# Patient Record
Sex: MALE | Race: WHITE | NOT HISPANIC OR LATINO | URBAN - METROPOLITAN AREA
[De-identification: names, ages, dates, MRNs, and addresses within clinical notes are randomized per-mention and may not be internally consistent; named-entity substitution may affect disease eponyms.]

---

## 2018-03-01 ENCOUNTER — OUTPATIENT (OUTPATIENT)
Dept: OUTPATIENT SERVICES | Facility: HOSPITAL | Age: 33
LOS: 1 days | End: 2018-03-01
Payer: MEDICAID

## 2018-03-01 PROCEDURE — G9001: CPT

## 2018-03-14 DIAGNOSIS — R69 ILLNESS, UNSPECIFIED: ICD-10-CM

## 2019-01-05 ENCOUNTER — INPATIENT (INPATIENT)
Facility: HOSPITAL | Age: 34
LOS: 0 days | Discharge: LEFT BEFORE DISCHARGE | DRG: 202 | End: 2019-01-05
Attending: INTERNAL MEDICINE | Admitting: INTERNAL MEDICINE
Payer: MEDICAID

## 2019-01-05 VITALS
OXYGEN SATURATION: 90 % | SYSTOLIC BLOOD PRESSURE: 140 MMHG | RESPIRATION RATE: 18 BRPM | DIASTOLIC BLOOD PRESSURE: 75 MMHG | HEART RATE: 110 BPM

## 2019-01-05 VITALS
RESPIRATION RATE: 30 BRPM | HEART RATE: 150 BPM | DIASTOLIC BLOOD PRESSURE: 77 MMHG | SYSTOLIC BLOOD PRESSURE: 183 MMHG | OXYGEN SATURATION: 90 %

## 2019-01-05 DIAGNOSIS — N17.9 ACUTE KIDNEY FAILURE, UNSPECIFIED: ICD-10-CM

## 2019-01-05 DIAGNOSIS — Z29.9 ENCOUNTER FOR PROPHYLACTIC MEASURES, UNSPECIFIED: ICD-10-CM

## 2019-01-05 DIAGNOSIS — R63.8 OTHER SYMPTOMS AND SIGNS CONCERNING FOOD AND FLUID INTAKE: ICD-10-CM

## 2019-01-05 DIAGNOSIS — Z91.89 OTHER SPECIFIED PERSONAL RISK FACTORS, NOT ELSEWHERE CLASSIFIED: ICD-10-CM

## 2019-01-05 DIAGNOSIS — J18.9 PNEUMONIA, UNSPECIFIED ORGANISM: ICD-10-CM

## 2019-01-05 DIAGNOSIS — J45.901 UNSPECIFIED ASTHMA WITH (ACUTE) EXACERBATION: ICD-10-CM

## 2019-01-05 LAB
ALBUMIN SERPL ELPH-MCNC: 4.5 G/DL — SIGNIFICANT CHANGE UP (ref 3.3–5)
ALP SERPL-CCNC: 111 U/L — SIGNIFICANT CHANGE UP (ref 40–120)
ALT FLD-CCNC: 27 U/L — SIGNIFICANT CHANGE UP (ref 10–45)
ANION GAP SERPL CALC-SCNC: 20 MMOL/L — HIGH (ref 5–17)
AST SERPL-CCNC: 27 U/L — SIGNIFICANT CHANGE UP (ref 10–40)
BASE EXCESS BLDV CALC-SCNC: -8.5 MMOL/L — SIGNIFICANT CHANGE UP
BASOPHILS NFR BLD AUTO: 1.5 % — SIGNIFICANT CHANGE UP (ref 0–2)
BILIRUB SERPL-MCNC: 0.5 MG/DL — SIGNIFICANT CHANGE UP (ref 0.2–1.2)
BUN SERPL-MCNC: 31 MG/DL — HIGH (ref 7–23)
CALCIUM SERPL-MCNC: 9 MG/DL — SIGNIFICANT CHANGE UP (ref 8.4–10.5)
CHLORIDE SERPL-SCNC: 98 MMOL/L — SIGNIFICANT CHANGE UP (ref 96–108)
CO2 SERPL-SCNC: 23 MMOL/L — SIGNIFICANT CHANGE UP (ref 22–31)
CREAT ?TM UR-MCNC: 34 MG/DL — SIGNIFICANT CHANGE UP
CREAT SERPL-MCNC: 1.5 MG/DL — HIGH (ref 0.5–1.3)
EOSINOPHIL NFR BLD AUTO: 9 % — HIGH (ref 0–6)
GLUCOSE SERPL-MCNC: 168 MG/DL — HIGH (ref 70–99)
HCO3 BLDV-SCNC: 20 MMOL/L — SIGNIFICANT CHANGE UP (ref 20–27)
HCT VFR BLD CALC: 43.8 % — SIGNIFICANT CHANGE UP (ref 39–50)
HGB BLD-MCNC: 14 G/DL — SIGNIFICANT CHANGE UP (ref 13–17)
LACTATE SERPL-SCNC: 2.1 MMOL/L — HIGH (ref 0.5–2)
LACTATE SERPL-SCNC: 5.7 MMOL/L — CRITICAL HIGH (ref 0.5–2)
LYMPHOCYTES # BLD AUTO: 29.9 % — SIGNIFICANT CHANGE UP (ref 13–44)
MAGNESIUM SERPL-MCNC: 3.7 MG/DL — HIGH (ref 1.6–2.6)
MCHC RBC-ENTMCNC: 29 PG — SIGNIFICANT CHANGE UP (ref 27–34)
MCHC RBC-ENTMCNC: 32 G/DL — SIGNIFICANT CHANGE UP (ref 32–36)
MCV RBC AUTO: 90.9 FL — SIGNIFICANT CHANGE UP (ref 80–100)
MONOCYTES NFR BLD AUTO: 11.2 % — SIGNIFICANT CHANGE UP (ref 2–14)
NEUTROPHILS NFR BLD AUTO: 48.4 % — SIGNIFICANT CHANGE UP (ref 43–77)
PCO2 BLDV: 53 MMHG — HIGH (ref 41–51)
PH BLDV: 7.2 — CRITICAL LOW (ref 7.32–7.43)
PLATELET # BLD AUTO: 267 K/UL — SIGNIFICANT CHANGE UP (ref 150–400)
PO2 BLDV: 124 MMHG — SIGNIFICANT CHANGE UP
POTASSIUM SERPL-MCNC: 4.6 MMOL/L — SIGNIFICANT CHANGE UP (ref 3.5–5.3)
POTASSIUM SERPL-SCNC: 4.6 MMOL/L — SIGNIFICANT CHANGE UP (ref 3.5–5.3)
PROT SERPL-MCNC: 8 G/DL — SIGNIFICANT CHANGE UP (ref 6–8.3)
RAPID RVP RESULT: SIGNIFICANT CHANGE UP
RBC # BLD: 4.82 M/UL — SIGNIFICANT CHANGE UP (ref 4.2–5.8)
RBC # FLD: 13.5 % — SIGNIFICANT CHANGE UP (ref 10.3–16.9)
SAO2 % BLDV: 98 % — SIGNIFICANT CHANGE UP
SODIUM SERPL-SCNC: 141 MMOL/L — SIGNIFICANT CHANGE UP (ref 135–145)
SODIUM UR-SCNC: 128 MMOL/L — SIGNIFICANT CHANGE UP
WBC # BLD: 10.9 K/UL — HIGH (ref 3.8–10.5)
WBC # FLD AUTO: 10.9 K/UL — HIGH (ref 3.8–10.5)

## 2019-01-05 PROCEDURE — 71045 X-RAY EXAM CHEST 1 VIEW: CPT | Mod: 26

## 2019-01-05 PROCEDURE — 93010 ELECTROCARDIOGRAM REPORT: CPT

## 2019-01-05 PROCEDURE — 99285 EMERGENCY DEPT VISIT HI MDM: CPT | Mod: 25

## 2019-01-05 RX ORDER — BUDESONIDE AND FORMOTEROL FUMARATE DIHYDRATE 160; 4.5 UG/1; UG/1
2 AEROSOL RESPIRATORY (INHALATION) EVERY 12 HOURS
Qty: 0 | Refills: 0 | Status: DISCONTINUED | OUTPATIENT
Start: 2019-01-05 | End: 2019-01-05

## 2019-01-05 RX ORDER — AZITHROMYCIN 500 MG/1
500 TABLET, FILM COATED ORAL EVERY 24 HOURS
Qty: 0 | Refills: 0 | Status: CANCELLED | OUTPATIENT
Start: 2019-01-06 | End: 2019-01-05

## 2019-01-05 RX ORDER — DEXTROSE 50 % IN WATER 50 %
15 SYRINGE (ML) INTRAVENOUS ONCE
Qty: 0 | Refills: 0 | Status: DISCONTINUED | OUTPATIENT
Start: 2019-01-05 | End: 2019-01-05

## 2019-01-05 RX ORDER — ALBUTEROL 90 UG/1
2.5 AEROSOL, METERED ORAL ONCE
Qty: 0 | Refills: 0 | Status: DISCONTINUED | OUTPATIENT
Start: 2019-01-05 | End: 2019-01-05

## 2019-01-05 RX ORDER — MAGNESIUM SULFATE 500 MG/ML
2 VIAL (ML) INJECTION ONCE
Qty: 0 | Refills: 0 | Status: COMPLETED | OUTPATIENT
Start: 2019-01-05 | End: 2019-01-05

## 2019-01-05 RX ORDER — DEXTROSE 50 % IN WATER 50 %
25 SYRINGE (ML) INTRAVENOUS ONCE
Qty: 0 | Refills: 0 | Status: DISCONTINUED | OUTPATIENT
Start: 2019-01-05 | End: 2019-01-05

## 2019-01-05 RX ORDER — FAMOTIDINE 10 MG/ML
20 INJECTION INTRAVENOUS ONCE
Qty: 0 | Refills: 0 | Status: COMPLETED | OUTPATIENT
Start: 2019-01-05 | End: 2019-01-05

## 2019-01-05 RX ORDER — EPINEPHRINE 0.3 MG/.3ML
0.3 INJECTION INTRAMUSCULAR; SUBCUTANEOUS ONCE
Qty: 0 | Refills: 0 | Status: COMPLETED | OUTPATIENT
Start: 2019-01-05 | End: 2019-01-05

## 2019-01-05 RX ORDER — SODIUM CHLORIDE 9 MG/ML
1000 INJECTION INTRAMUSCULAR; INTRAVENOUS; SUBCUTANEOUS ONCE
Qty: 0 | Refills: 0 | Status: COMPLETED | OUTPATIENT
Start: 2019-01-05 | End: 2019-01-05

## 2019-01-05 RX ORDER — IPRATROPIUM/ALBUTEROL SULFATE 18-103MCG
3 AEROSOL WITH ADAPTER (GRAM) INHALATION
Qty: 0 | Refills: 0 | Status: COMPLETED | OUTPATIENT
Start: 2019-01-05 | End: 2019-01-05

## 2019-01-05 RX ORDER — MONTELUKAST 4 MG/1
10 TABLET, CHEWABLE ORAL ONCE
Qty: 0 | Refills: 0 | Status: COMPLETED | OUTPATIENT
Start: 2019-01-05 | End: 2019-01-05

## 2019-01-05 RX ORDER — ALBUTEROL 90 UG/1
2.5 AEROSOL, METERED ORAL
Qty: 0 | Refills: 0 | Status: COMPLETED | OUTPATIENT
Start: 2019-01-05 | End: 2019-01-05

## 2019-01-05 RX ORDER — IPRATROPIUM/ALBUTEROL SULFATE 18-103MCG
3 AEROSOL WITH ADAPTER (GRAM) INHALATION EVERY 4 HOURS
Qty: 0 | Refills: 0 | Status: DISCONTINUED | OUTPATIENT
Start: 2019-01-05 | End: 2019-01-05

## 2019-01-05 RX ORDER — AZITHROMYCIN 500 MG/1
500 TABLET, FILM COATED ORAL ONCE
Qty: 0 | Refills: 0 | Status: COMPLETED | OUTPATIENT
Start: 2019-01-05 | End: 2019-01-05

## 2019-01-05 RX ORDER — ALBUTEROL 90 UG/1
2.5 AEROSOL, METERED ORAL ONCE
Qty: 0 | Refills: 0 | Status: COMPLETED | OUTPATIENT
Start: 2019-01-05 | End: 2019-01-05

## 2019-01-05 RX ORDER — OXYMETAZOLINE HYDROCHLORIDE 0.5 MG/ML
1 SPRAY NASAL
Qty: 0 | Refills: 0 | Status: DISCONTINUED | OUTPATIENT
Start: 2019-01-05 | End: 2019-01-05

## 2019-01-05 RX ORDER — ALBUTEROL 90 UG/1
1 AEROSOL, METERED ORAL ONCE
Qty: 0 | Refills: 0 | Status: COMPLETED | OUTPATIENT
Start: 2019-01-05 | End: 2019-01-05

## 2019-01-05 RX ORDER — INSULIN LISPRO 100/ML
VIAL (ML) SUBCUTANEOUS
Qty: 0 | Refills: 0 | Status: DISCONTINUED | OUTPATIENT
Start: 2019-01-05 | End: 2019-01-05

## 2019-01-05 RX ORDER — FLUTICASONE PROPIONATE 50 MCG
1 SPRAY, SUSPENSION NASAL
Qty: 0 | Refills: 0 | Status: DISCONTINUED | OUTPATIENT
Start: 2019-01-05 | End: 2019-01-05

## 2019-01-05 RX ORDER — IPRATROPIUM/ALBUTEROL SULFATE 18-103MCG
3 AEROSOL WITH ADAPTER (GRAM) INHALATION ONCE
Qty: 0 | Refills: 0 | Status: COMPLETED | OUTPATIENT
Start: 2019-01-05 | End: 2019-01-05

## 2019-01-05 RX ORDER — SODIUM CHLORIDE 9 MG/ML
1000 INJECTION, SOLUTION INTRAVENOUS
Qty: 0 | Refills: 0 | Status: DISCONTINUED | OUTPATIENT
Start: 2019-01-05 | End: 2019-01-05

## 2019-01-05 RX ORDER — TIOTROPIUM BROMIDE 18 UG/1
1 CAPSULE ORAL; RESPIRATORY (INHALATION) DAILY
Qty: 0 | Refills: 0 | Status: DISCONTINUED | OUTPATIENT
Start: 2019-01-05 | End: 2019-01-05

## 2019-01-05 RX ORDER — GLUCAGON INJECTION, SOLUTION 0.5 MG/.1ML
1 INJECTION, SOLUTION SUBCUTANEOUS ONCE
Qty: 0 | Refills: 0 | Status: DISCONTINUED | OUTPATIENT
Start: 2019-01-05 | End: 2019-01-05

## 2019-01-05 RX ORDER — MAGNESIUM SULFATE 500 MG/ML
2 VIAL (ML) INJECTION ONCE
Qty: 0 | Refills: 0 | Status: DISCONTINUED | OUTPATIENT
Start: 2019-01-05 | End: 2019-01-05

## 2019-01-05 RX ORDER — DEXTROSE 50 % IN WATER 50 %
12.5 SYRINGE (ML) INTRAVENOUS ONCE
Qty: 0 | Refills: 0 | Status: DISCONTINUED | OUTPATIENT
Start: 2019-01-05 | End: 2019-01-05

## 2019-01-05 RX ADMIN — ALBUTEROL 2.5 MILLIGRAM(S): 90 AEROSOL, METERED ORAL at 02:35

## 2019-01-05 RX ADMIN — FAMOTIDINE 20 MILLIGRAM(S): 10 INJECTION INTRAVENOUS at 02:05

## 2019-01-05 RX ADMIN — SODIUM CHLORIDE 1000 MILLILITER(S): 9 INJECTION INTRAMUSCULAR; INTRAVENOUS; SUBCUTANEOUS at 02:30

## 2019-01-05 RX ADMIN — Medication 3 MILLILITER(S): at 02:27

## 2019-01-05 RX ADMIN — Medication 3 MILLILITER(S): at 02:28

## 2019-01-05 RX ADMIN — Medication 3 MILLILITER(S): at 05:07

## 2019-01-05 RX ADMIN — Medication 1 SPRAY(S): at 07:11

## 2019-01-05 RX ADMIN — EPINEPHRINE 0.3 MILLIGRAM(S): 0.3 INJECTION INTRAMUSCULAR; SUBCUTANEOUS at 02:00

## 2019-01-05 RX ADMIN — Medication 3 MILLILITER(S): at 02:00

## 2019-01-05 RX ADMIN — Medication 50 GRAM(S): at 02:10

## 2019-01-05 RX ADMIN — SODIUM CHLORIDE 2000 MILLILITER(S): 9 INJECTION INTRAMUSCULAR; INTRAVENOUS; SUBCUTANEOUS at 02:00

## 2019-01-05 RX ADMIN — Medication 125 MILLIGRAM(S): at 02:08

## 2019-01-05 RX ADMIN — Medication 80 MILLIGRAM(S): at 05:07

## 2019-01-05 RX ADMIN — OXYMETAZOLINE HYDROCHLORIDE 1 SPRAY(S): 0.5 SPRAY NASAL at 07:11

## 2019-01-05 RX ADMIN — Medication 2 GRAM(S): at 02:28

## 2019-01-05 RX ADMIN — ALBUTEROL 1 PUFF(S): 90 AEROSOL, METERED ORAL at 07:28

## 2019-01-05 RX ADMIN — AZITHROMYCIN 255 MILLIGRAM(S): 500 TABLET, FILM COATED ORAL at 04:27

## 2019-01-05 RX ADMIN — BUDESONIDE AND FORMOTEROL FUMARATE DIHYDRATE 2 PUFF(S): 160; 4.5 AEROSOL RESPIRATORY (INHALATION) at 07:28

## 2019-01-05 RX ADMIN — ALBUTEROL 2.5 MILLIGRAM(S): 90 AEROSOL, METERED ORAL at 03:30

## 2019-01-05 RX ADMIN — ALBUTEROL 2.5 MILLIGRAM(S): 90 AEROSOL, METERED ORAL at 03:25

## 2019-01-05 RX ADMIN — SODIUM CHLORIDE 2000 MILLILITER(S): 9 INJECTION INTRAMUSCULAR; INTRAVENOUS; SUBCUTANEOUS at 02:15

## 2019-01-05 RX ADMIN — ALBUTEROL 2.5 MILLIGRAM(S): 90 AEROSOL, METERED ORAL at 03:35

## 2019-01-05 RX ADMIN — ALBUTEROL 2.5 MILLIGRAM(S): 90 AEROSOL, METERED ORAL at 02:50

## 2019-01-05 RX ADMIN — MONTELUKAST 10 MILLIGRAM(S): 4 TABLET, CHEWABLE ORAL at 05:07

## 2019-01-05 RX ADMIN — Medication 3 MILLILITER(S): at 07:11

## 2019-01-05 NOTE — ED ADULT NURSE REASSESSMENT NOTE - NS ED NURSE REASSESS COMMENT FT1
Pt continues to wheeze but reports improvement in breathing. Pt pending ICU consult. Close monitoring continues

## 2019-01-05 NOTE — ED ADULT NURSE NOTE - OBJECTIVE STATEMENT
Pt with c/o of Asthma attack. Pt in distress. Noted to be cyanotic, posturing. Pt upgraded and emergent treatment administered. Pt responding positively

## 2019-01-05 NOTE — CONSULT NOTE ADULT - SUBJECTIVE AND OBJECTIVE BOX
**************************  CRITICAL CARE CONSULT NOTE    34 y/o M with a history of asthma (never intubated, never hospitalized), walked into ED for progressively worsening SOB.     In the ED, VS T     PMHx: as above.  Surg hx: as above.  Meds: albuterol, symbicort  All: seasonal allergies  Soc hx: never smoker, denies etoh or drugs  Fhx: non-contributory    VITAL SIGNS:  ICU Vital Signs Last 24 Hrs  T(C): 36.7 (05 Jan 2019 02:35), Max: 36.7 (05 Jan 2019 02:35)  T(F): 98 (05 Jan 2019 02:35), Max: 98 (05 Jan 2019 02:35)  HR: 116 (05 Jan 2019 03:51) (115 - 150)  BP: 118/61 (05 Jan 2019 03:51) (118/61 - 183/77)  RR: 22 (05 Jan 2019 03:51) (22 - 30)  SpO2: 97% (05 Jan 2019 03:51) (90% - 98%)    PHYSICAL EXAM   General: NAD, comfortable, AOx3  HEENT: NCAT, PERRL, clear conjunctiva, no scleral icterus  Neck: supple, no JVD  Respiratory: CTA b/l, no wheezing, rhonchi, rales  Cardiovascular: RRR, normal S1S2, no M/R/G  Abdomen: soft, NT/ND, bowel sounds normoactive throughout, no palpable masses  Extremities: WWP, no clubbing, cyanosis, or edema  Neuro: AOx3, no motor or sensory deficits    LABS                        14.0   10.9  )-----------( 267      ( 05 Jan 2019 02:26 )             43.8     01-05    141  |  98  |  31<H>  ----------------------------<  168<H>  4.6   |  23  |  1.50<H>    Ca    9.0      05 Jan 2019 02:26  Mg     3.7     01-05    TPro  8.0  /  Alb  4.5  /  TBili  0.5  /  DBili  x   /  AST  27  /  ALT  27  /  AlkPhos  111  01-05      Lactate, Blood: 5.7 mmoL/L (01-05-19 @ 02:25)        IMAGING  CXR -   EKG - **************************  CRITICAL CARE CONSULT NOTE    32 y/o M with a history of asthma (never intubated, never hospitalized), walked into ED for progressively worsening SOB. States that he ran out of albuterol and symbicort two to three weeks ago. For the last several days, has been having worsening cough, rhinorrhea, and sore throat. Today had worsening SOB and took a cab to the ER. Otherwise denies fever, chills, lightheadedness, palpitations, N/V/D/C, abdominal pain, dysuria, hematuria, changes in bowel habits, LE edema.     In the ED, VS T 98, , /77, RR 30, O2 90 on NRB. Labs significant for WBC 10.9 (neuts 48%, eos 9%), BUN/Cr 31/1.50, lactate 5.7, ABG pH 7.20, pCO2 53, pO2 124, HCO3 20, O2 sat 98. CXR with reticulonodular opacities and elevated left hemidiaphragm.     PMHx: as above.  Surg hx: as above.  Meds: albuterol, symbicort  All: seasonal allergies  Soc hx: never smoker, denies etoh or drugs  Fhx: non-contributory    VITAL SIGNS:  ICU Vital Signs Last 24 Hrs  T(C): 36.7 (05 Jan 2019 02:35), Max: 36.7 (05 Jan 2019 02:35)  T(F): 98 (05 Jan 2019 02:35), Max: 98 (05 Jan 2019 02:35)  HR: 116 (05 Jan 2019 03:51) (115 - 150)  BP: 118/61 (05 Jan 2019 03:51) (118/61 - 183/77)  RR: 22 (05 Jan 2019 03:51) (22 - 30)  SpO2: 97% (05 Jan 2019 03:51) (90% - 98%)    PHYSICAL EXAM   General: NAD, speaking in full sentences through ventimask, nasal voice, obese  HEENT: NCAT, PERRL, clear conjunctiva, no scleral icterus  Neck: supple, no JVD, wide neck circumference  Respiratory: end expiratory wheezing b/l, decreased breath sounds at the bases, no accessory muscle use at this time  Cardiovascular: tachycardic 120s, regular rhythm, normal S1S2, no M/R/G  Abdomen: soft, NT/ND, bowel sounds normoactive throughout, no palpable masses; obese  Extremities: WWP, no clubbing, cyanosis, or edema  Neuro: AOx3, no motor or sensory deficits    LABS                        14.0   10.9  )-----------( 267      ( 05 Jan 2019 02:26 )             43.8     01-05    141  |  98  |  31<H>  ----------------------------<  168<H>  4.6   |  23  |  1.50<H>    Ca    9.0      05 Jan 2019 02:26  Mg     3.7     01-05    TPro  8.0  /  Alb  4.5  /  TBili  0.5  /  DBili  x   /  AST  27  /  ALT  27  /  AlkPhos  111  01-05    Lactate, Blood: 5.7 mmoL/L (01-05-19 @ 02:25)    IMAGING  CXR -   EKG - **************************  CRITICAL CARE CONSULT NOTE    32 y/o M with a history of asthma (never intubated, never hospitalized), walked into ED for progressively worsening SOB. States that he ran out of albuterol and symbicort two to three weeks ago. For the last several days, has been having worsening cough, rhinorrhea, and sore throat. Today had worsening SOB and took a cab to the ER. Denies nighttime awakenings, dyspnea on exertion at baseline, prior intubations/hospitalizations, sick contacts, recent travel. He is adamant that he will "sign out" come morning. Otherwise denies fever, chills, lightheadedness, palpitations, N/V/D/C, abdominal pain, dysuria, hematuria, changes in bowel habits, LE edema.    In the ED, VS T 98, , /77, RR 30, O2 90 on NRB. Labs significant for WBC 10.9 (neuts 48%, eos 9%), BUN/Cr 31/1.50, lactate 5.7, ABG pH 7.20, pCO2 53, pO2 124, HCO3 20, O2 sat 98. CXR with reticulonodular opacities and elevated left hemidiaphragm.     PMHx: as above.  Surg hx: as above.  Meds: albuterol, symbicort  All: seasonal allergies  Soc hx: never smoker, denies etoh or drugs  Fhx: non-contributory    VITAL SIGNS:  ICU Vital Signs Last 24 Hrs  T(C): 36.7 (05 Jan 2019 02:35), Max: 36.7 (05 Jan 2019 02:35)  T(F): 98 (05 Jan 2019 02:35), Max: 98 (05 Jan 2019 02:35)  HR: 116 (05 Jan 2019 03:51) (115 - 150)  BP: 118/61 (05 Jan 2019 03:51) (118/61 - 183/77)  RR: 22 (05 Jan 2019 03:51) (22 - 30)  SpO2: 97% (05 Jan 2019 03:51) (90% - 98%)    PHYSICAL EXAM   General: NAD, speaking in full sentences through ventimask, nasal voice, obese  HEENT: NCAT, PERRL, clear conjunctiva, no scleral icterus  Neck: supple, no JVD, wide neck circumference  Respiratory: end expiratory wheezing b/l, decreased breath sounds at the bases, no accessory muscle use at this time  Cardiovascular: tachycardic 120s, regular rhythm, normal S1S2, no M/R/G  Abdomen: soft, NT/ND, bowel sounds normoactive throughout, no palpable masses; obese  Extremities: WWP, no clubbing, cyanosis, or edema  Neuro: AOx3, no motor or sensory deficits    LABS                        14.0   10.9  )-----------( 267      ( 05 Jan 2019 02:26 )             43.8     01-05    141  |  98  |  31<H>  ----------------------------<  168<H>  4.6   |  23  |  1.50<H>    Ca    9.0      05 Jan 2019 02:26  Mg     3.7     01-05    TPro  8.0  /  Alb  4.5  /  TBili  0.5  /  DBili  x   /  AST  27  /  ALT  27  /  AlkPhos  111  01-05    Lactate, Blood: 5.7 mmoL/L (01-05-19 @ 02:25)    IMAGING  CXR -   EKG -

## 2019-01-05 NOTE — H&P ADULT - PROBLEM SELECTOR PLAN 6
1) PCP Contacted on Admission: (Y/N) --> Name & Phone #: N. admitted overnight. Son Corley MD [phone]  2) Date of Contact with PCP:  3) PCP Contacted at Discharge: (Y/N)  4) Summary of Handoff Given to PCP:   5) Post-Discharge Appointment Date and Location:

## 2019-01-05 NOTE — H&P ADULT - HISTORY OF PRESENT ILLNESS
34 yo w hx of asthma presented to ED w SOB found to be cyanotic and hypoxic. Patient states he ran out of his normal medications (ventolin inhaler and symbicort) for past few weeks. 2-3 days ago started having URI symptoms and yesterday had worsening SOB. Presented to ED in a taxi and was found to be cyanotic w unreadable O2 sat.    In the ED, initial vital signs T 98,  (now 116), /77 (now 118/61), RR 30 (now 22), O2 sat 90 (now 97). Labs significant for eosinophilia 9.0, Lactate 5.7->2.1, AG 20, BUN 31, Cr 1.5. ABG w pH 7.2, pCO2 52, O2sat 98. 32 yo w hx of asthma presented to ED via cab w SOB found to be cyanotic and hypoxic. Patient states he ran out of his normal medications (ventolin inhaler and symbicort) for past few weeks. 2-3 days ago started having URI symptoms including rhinorrhea, cough, and yesterday had worsening SOB. Presented to ED in a taxi and was found to be cyanotic w unreadable O2 sat. Patient denies fever, chills, HA, N/V, CP, abdominal pain, changes in bowel or bladder habits    In the ED, initial vital signs T 98,  (now 116), /77 (now 118/61), RR 30 (now 22), O2 sat 90 (now 97). Labs significant for eosinophilia 9.0, Lactate 5.7->2.1, AG 20, BUN 31, Cr 1.5. ABG w pH 7.2, pCO2 52, O2sat 98. CXR with diffuse reticulonodular opacities, elevated L hemidiaphragm. In the ED, patient given  2L NS, epinephrine IM, famotidine 20mg IV, solumedrol 125 IV, albuterol nebs x3, duonebs x1, Mg 2mg IV, and was placed on ventimask. Now on 2L NC. Patient stating that he needs to leave at 8 am in order to be in [place] by tomorrow morning.

## 2019-01-05 NOTE — H&P ADULT - NSHPLABSRESULTS_GEN_ALL_CORE
.  LABS:                         14.0   10.9  )-----------( 267      ( 05 Jan 2019 02:26 )             43.8     01-05    141  |  98  |  31<H>  ----------------------------<  168<H>  4.6   |  23  |  1.50<H>    Ca    9.0      05 Jan 2019 02:26  Mg     3.7     01-05    TPro  8.0  /  Alb  4.5  /  TBili  0.5  /  DBili  x   /  AST  27  /  ALT  27  /  AlkPhos  111  01-05              Lactate, Blood: 5.7 mmoL/L (01-05 @ 02:25)      RADIOLOGY, EKG & ADDITIONAL TESTS:    CXR with diffuse reticulonodular opacities and elevated L hemidiaphragm.

## 2019-01-05 NOTE — ED ADULT NURSE NOTE - NSIMPLEMENTINTERV_GEN_ALL_ED
Implemented All Universal Safety Interventions:  [place] to call system. Call bell, personal items and telephone within reach. Instruct patient to call for assistance. Room bathroom lighting operational. Non-slip footwear when patient is off stretcher. Physically safe environment: no spills, clutter or unnecessary equipment. Stretcher in lowest position, wheels locked, appropriate side rails in place.

## 2019-01-05 NOTE — ED PROVIDER NOTE - PHYSICAL EXAMINATION
VITAL SIGNS: I have reviewed nursing notes and confirm.  CONSTITUTIONAL: Well-developed; well-nourished male diaphoretic, cyanotic, tripoding in severe respiratory distress appearing to be rai-respiratory arrest.  SKIN: Agree with RN documentation regarding decubitus evaluation. Remainder of skin exam is cool/clammy/cyanotic and diaphoretic, no acute rash.  HEAD: Normocephalic; atraumatic.  EYES: PERRL, EOM intact; conjunctiva and sclera clear.  ENT: No nasal discharge; airway clear.  NECK: Supple; non tender.  CARD: S1, S2 normal; no murmurs, gallops, or rubs. + tachycardic regular rhythm  RESP: + faint wheeze b/l with very little air movement, poor excursion, + tachypnea with supraclavicular retractions/nasal flaring  ABD: Normal bowel sounds; soft; non-distended; non-tender  EXT: Normal ROM. No peripheral edema, non-ttp, distal pulses intact all ext  NEURO: Alert, oriented. Grossly intact.  PSYCH: Cooperative, appropriate.

## 2019-01-05 NOTE — H&P ADULT - NSHPPHYSICALEXAM_GEN_ALL_CORE
.  VITAL SIGNS:  T(C): 36.7 (01-05-19 @ 02:35), Max: 36.7 (01-05-19 @ 02:35)  T(F): 98 (01-05-19 @ 02:35), Max: 98 (01-05-19 @ 02:35)  HR: 116 (01-05-19 @ 04:48) (115 - 150)  BP: 137/78 (01-05-19 @ 04:48) (118/61 - 183/77)  BP(mean): 101 (01-05-19 @ 04:45) (101 - 101)  RR: 19 (01-05-19 @ 04:48) (19 - 30)  SpO2: 91% (01-05-19 @ 04:48) (89% - 98%)  Wt(kg): --    PHYSICAL EXAM:    Constitutional: obese male resting comfortably in bed  Head: NC/AT  Eyes: PERRL, EOMI, anicteric sclera  ENT: +nasal discharge; uvula midline, no oropharyngeal erythema or exudates; dry mucus membranes  Neck: supple; no JVD or thyromegaly, large neck circumference  Respiratory: diffuse wheezing in all lung fields, tachypneic  Cardiac: +S1/S2; RRR; no M/R/G  Gastrointestinal: abdomen soft, NT/ND; no rebound or guarding; +BS  Extremities: WWP, no clubbing or cyanosis; no peripheral edema  Musculoskeletal: NROM x4; no joint swelling, tenderness or erythema  Vascular: 2+ radial, DP pulses B/L  Dermatologic: skin warm, dry and intact; no rashes, wounds, or scars  Lymphatic: no submandibular or cervical LAD  Neurologic: AAOx3; CNII-XII grossly intact; no focal deficits  Psychiatric: affect and characteristics of appearance, verbalizations, behaviors are appropriate

## 2019-01-05 NOTE — ED PROVIDER NOTE - OBJECTIVE STATEMENT
32 y/o M w/sx asthma, never intubated, normally on symbicort and ventolin but ran out of both, has been coughing c/o URI sx this week w/worsening work of breathing/SOB for past 2 days, acutely worse this evening. Denies fever/chills. Denies CP, abd pain or n/v. No known allergies. Presented to walk-in triage diaphoretic, cyanotic, moving very little air w/pulse ox not registering, in severe respiratory distress.

## 2019-01-05 NOTE — ED ADULT NURSE REASSESSMENT NOTE - NS ED NURSE REASSESS COMMENT FT1
Pt condition improved. Pt reports his breathing is improving. Pt is no longer cyanotic and diaphoretic. Pt responding positively to medications. Pt on cardiac monitoring with automatic BP and pulse ox in place. See flowsheet. Close monitoring continues.

## 2019-01-05 NOTE — CONSULT NOTE ADULT - ASSESSMENT
34 y/o M with a history of asthma (never intubated, never hospitalized), walked into ED for progressively worsening SOB. MICU consulted for management of asthma exacerbation.    1. Asthma exacerbation. History of asthma, never intubated or hospitalized. Denies having triggers. Ran out of albuterol and symbicort two to three weeks ago. Presented with URI symptoms and severe SOB. Initially cyanotic, with lactate 5.4 and VBG pH 7.20. CXR with reticulonodular opacities and elevated left hemidiaphragm. Exacerbation likely triggered by viral URI vs. pneumonia.   - Continue albuterol q4h standing.  - Solumedrol  - Azithromycin 500mg x 5 days.   - Trend lactate.  - Follow up blood cultures.  - BIPAP overnight if increased work of breathing.   - Flonase for nasal congestion.  - Patient planning on leaving AMA. If he does, can order albuterol and symbicort inhalers so he can take them home. Can give a dose of steroids before he leaves.    Case discussed with Dr. Fernandez (CCM attending) and ED attending.   Dispo: [address] [facility] for close monitoring of respiratory status.    Thank you for consulting [facility].     Dieudonne Briones, PGY-3

## 2019-01-05 NOTE — H&P ADULT - ASSESSMENT
34 yo w hx of asthma, non compliant w medications presenting w SOB due to asthma exacerbation 2/2 to CAP vs URI.

## 2019-01-05 NOTE — H&P ADULT - PROBLEM SELECTOR PLAN 1
Patient presented cyanotic w unreadable O2 sat. ran out of inhalers and has been having URI symptoms. CXR concerning for possible CAP. RVP negative. Treating as asthma exacerbation 2/2 to CAP  - s/p epi IM, famotidine 20mg IV, solumedrol 125 IV, albuterol nebs x3, duonebs x1, Mg 2mg IV in ED. also on ventimask in ED, now on 2L NC  - continue duonebs q4h standing and additional PRN  - will provide patient w ventolin rescue inhaler, singulair inhaler, and symbicort inhaler  - continue prednisone 80mg QD  - z-pac for cap  - f/u blood cx Patient presented cyanotic w unreadable O2 sat. ran out of inhalers and has been having URI symptoms. CXR concerning for possible CAP. RVP negative. Treating as asthma exacerbation 2/2 to CAP  - s/p epi IM, famotidine 20mg IV, solumedrol 125 IV, albuterol nebs x3, duonebs x1, Mg 2mg IV in ED. also on ventimask in ED, now on 2L NC  - continue duonebs q4h standing and additional PRN  - will provide patient w ventolin rescue inhaler, singulair inhaler, and symbicort inhaler  - continue prednisone 80mg QD  - z-pac for cap  - f/u blood cx  - afrin for nasal congestion

## 2019-01-05 NOTE — ED PROVIDER NOTE - MEDICAL DECISION MAKING DETAILS
+ severe respiratory distress/failure in setting of known asthma. Sx improved s/p epi, steroids, nebs, mag, IVF, though still with full insp/exp wheeze. Seen by ICU, accepted to telemetry.

## 2019-01-07 DIAGNOSIS — J45.901 UNSPECIFIED ASTHMA WITH (ACUTE) EXACERBATION: ICD-10-CM

## 2019-01-07 DIAGNOSIS — Z91.14 PATIENT'S OTHER NONCOMPLIANCE WITH MEDICATION REGIMEN: ICD-10-CM

## 2019-01-07 DIAGNOSIS — R06.02 SHORTNESS OF BREATH: ICD-10-CM

## 2019-01-07 DIAGNOSIS — J18.9 PNEUMONIA, UNSPECIFIED ORGANISM: ICD-10-CM

## 2019-01-07 DIAGNOSIS — N17.9 ACUTE KIDNEY FAILURE, UNSPECIFIED: ICD-10-CM

## 2019-01-07 DIAGNOSIS — J96.90 RESPIRATORY FAILURE, UNSPECIFIED, UNSPECIFIED WHETHER WITH HYPOXIA OR HYPERCAPNIA: ICD-10-CM

## 2019-01-07 DIAGNOSIS — E66.9 OBESITY, UNSPECIFIED: ICD-10-CM

## 2019-01-10 LAB
CULTURE RESULTS: SIGNIFICANT CHANGE UP
CULTURE RESULTS: SIGNIFICANT CHANGE UP
SPECIMEN SOURCE: SIGNIFICANT CHANGE UP
SPECIMEN SOURCE: SIGNIFICANT CHANGE UP

## 2019-01-14 ENCOUNTER — EMERGENCY (EMERGENCY)
Facility: HOSPITAL | Age: 34
LOS: 1 days | Discharge: HOME | End: 2019-01-14
Attending: EMERGENCY MEDICINE | Admitting: EMERGENCY MEDICINE
Payer: COMMERCIAL

## 2019-01-14 VITALS
TEMPERATURE: 98 F | DIASTOLIC BLOOD PRESSURE: 74 MMHG | RESPIRATION RATE: 22 BRPM | OXYGEN SATURATION: 93 % | SYSTOLIC BLOOD PRESSURE: 131 MMHG | HEART RATE: 109 BPM

## 2019-01-14 DIAGNOSIS — J45.901 UNSPECIFIED ASTHMA WITH (ACUTE) EXACERBATION: ICD-10-CM

## 2019-01-14 DIAGNOSIS — Z79.899 OTHER LONG TERM (CURRENT) DRUG THERAPY: ICD-10-CM

## 2019-01-14 DIAGNOSIS — Z79.52 LONG TERM (CURRENT) USE OF SYSTEMIC STEROIDS: ICD-10-CM

## 2019-01-14 PROBLEM — J45.909 UNSPECIFIED ASTHMA, UNCOMPLICATED: Chronic | Status: ACTIVE | Noted: 2019-01-05

## 2019-01-14 PROCEDURE — 99284 EMERGENCY DEPT VISIT MOD MDM: CPT

## 2019-01-14 PROCEDURE — 94640 AIRWAY INHALATION TREATMENT: CPT

## 2019-01-14 PROCEDURE — 99284 EMERGENCY DEPT VISIT MOD MDM: CPT | Mod: 25

## 2019-01-14 PROCEDURE — 96372 THER/PROPH/DIAG INJ SC/IM: CPT

## 2019-01-14 RX ORDER — IPRATROPIUM/ALBUTEROL SULFATE 18-103MCG
3 AEROSOL WITH ADAPTER (GRAM) INHALATION ONCE
Qty: 0 | Refills: 0 | Status: COMPLETED | OUTPATIENT
Start: 2019-01-14 | End: 2019-01-14

## 2019-01-14 RX ORDER — BUDESONIDE AND FORMOTEROL FUMARATE DIHYDRATE 160; 4.5 UG/1; UG/1
0 AEROSOL RESPIRATORY (INHALATION)
Qty: 0 | Refills: 0 | COMMUNITY

## 2019-01-14 RX ORDER — DEXAMETHASONE 0.5 MG/5ML
10 ELIXIR ORAL ONCE
Qty: 0 | Refills: 0 | Status: COMPLETED | OUTPATIENT
Start: 2019-01-14 | End: 2019-01-14

## 2019-01-14 RX ORDER — ALBUTEROL 90 UG/1
0 AEROSOL, METERED ORAL
Qty: 0 | Refills: 0 | COMMUNITY

## 2019-01-14 RX ORDER — ALBUTEROL 90 UG/1
2 AEROSOL, METERED ORAL
Qty: 9 | Refills: 0 | OUTPATIENT
Start: 2019-01-14 | End: 2019-02-12

## 2019-01-14 RX ORDER — BUDESONIDE AND FORMOTEROL FUMARATE DIHYDRATE 160; 4.5 UG/1; UG/1
2 AEROSOL RESPIRATORY (INHALATION) ONCE
Qty: 0 | Refills: 0 | Status: COMPLETED | OUTPATIENT
Start: 2019-01-14 | End: 2019-01-14

## 2019-01-14 RX ADMIN — Medication 3 MILLILITER(S): at 14:17

## 2019-01-14 RX ADMIN — Medication 10 MILLIGRAM(S): at 14:16

## 2019-01-14 RX ADMIN — Medication 3 MILLILITER(S): at 13:59

## 2019-01-14 RX ADMIN — BUDESONIDE AND FORMOTEROL FUMARATE DIHYDRATE 2 PUFF(S): 160; 4.5 AEROSOL RESPIRATORY (INHALATION) at 14:28

## 2019-01-14 NOTE — ED PROVIDER NOTE - NSFOLLOWUPCLINICS_GEN_ALL_ED_FT
[facility]  Family Medicine  [facility]. [address]  [place], [place] [facility]  Phone: [phone]  Fax:   Follow Up Time:

## 2019-01-14 NOTE — ED PROVIDER NOTE - OBJECTIVE STATEMENT
ventolin, symnicpty and no prefdbnone 34 yo w hx of asthma presented to ED with SOB, wheezing with asthma exacerbation and states that he ran out of his asthma meds (namely Symbicort). Pt was recently admitted to [facility] hospital 10 days ago for severe asthma exacerbation. Was not intubbate found to be cyanotic and hypoxic. Patient states he ran out of his normal medications (ventolin inhaler and symbicort) for past few weeks. 2-3 days ago started having URI symptoms including rhinorrhea, cough, and yesterday had worsening SOB. Presented to ED in a taxi and was found to be cyanotic w unreadable O2 sat. Patient denies fever, chills, HA, N/V, CP, abdominal pain, changes in bowel or bladder habits    In the ED, initial vital signs T 98,  (now 116), /77 (now 118/61), RR 30 (now 22), O2 sat 90 (now 97). Labs significant for eosinophilia 9.0, Lactate 5.7->2.1, AG 20, BUN 31, Cr 1.5. ABG w pH 7.2, pCO2 52, O2sat 98. CXR with diffuse reticulonodular opacities, elevated L hemidiaphragm. In the ED, patient given  2L NS, epinephrine IM, famotidine 20mg IV, solumedrol 125 IV, albuterol nebs x3, duonebs x1, Mg 2mg IV, and was placed on ventimask. Now on 2L NC. Patient stating that he needs to leave at 8 am in order to be in [place] by tomorrow 32 yo M with PMH of asthma, no prior intubations or asthma, presents today to ED with SOB, wheezing and asthma exacerbation and states that he ran out of his asthma meds (namely Symbicort). Pt was recently admitted to [facility] hospital 10 days ago for severe asthma exacerbation. Was not intubated but was found to be cyanotic and hypoxic and admitted to [address] [place] [address]. Patient states he ran out of his normal medications (ventolin inhaler and symbicort). Pt c/o some rhinorrhea, cough, and yesterday had worsening SOB. Patient denies fever, chills, HA, N/V, CP, abdominal pain, changes in bowel or bladder habits. Pt refusing IV line/ blood work/ w/up. States he has to leave in an hour for family reasons.

## 2019-01-14 NOTE — ED PROVIDER NOTE - MEDICAL DECISION MAKING DETAILS
34 yo M with PMH of asthma, no prior intubations or asthma, presents today to ED with SOB, wheezing and asthma exacerbation and states that he ran out of his asthma meds (namely Symbicort). Pt was recently admitted to [facility] hospital 10 days ago for severe asthma exacerbation. Was not intubated but was found to be cyanotic and hypoxic and admitted to [address] [place] [address]. Patient states he ran out of his normal medications (ventolin inhaler and symbicort). Pt c/o some rhinorrhea, cough, and yesterday had worsening SOB. Patient denies fever, chills, HA, N/V, CP, abdominal pain, changes in bowel or bladder habits. Pt refusing IV line/ blood work/ w/up. States he has to leave in an hour for family reasons. Given IM Decadron and nebs. Pt with O2 sats in low 90s. Resp effort normal. Does not want to stay or be observed and is walking out of ED after nebs and inhaler given. Return precautions given.

## 2019-01-14 NOTE — ED PROVIDER NOTE - CARE PROVIDER_API CALL
Ciaran Dobson), Critical Care Medicine; Pulmonary Disease  [address]  [place] [address]  Phone: [phone]  Fax: [phone]    Janette Choudhary), Critical Care Medicine; Pulmonary Disease  [address]  [address] [place] [address]  Phone: [phone]  Fax: [phone]

## 2019-01-14 NOTE — ED ADULT NURSE NOTE - OBJECTIVE STATEMENT
Patient is a 34yo male reporting he ran out of Albuterol inhaler and was supposed to be taking prednisone for asthma exacerbation but "it was never prescribed." Patient reports he feels like asthma is getting worse. Denies any chest pain, abdominal pain, n/v/d. Patient speaking in full sentences, lung sounds clear bilaterally. Patient also states, "I feel like I could just be anxious."

## 2019-10-31 PROCEDURE — 87486 CHLMYD PNEUM DNA AMP PROBE: CPT

## 2019-10-31 PROCEDURE — 87581 M.PNEUMON DNA AMP PROBE: CPT

## 2019-10-31 PROCEDURE — 96365 THER/PROPH/DIAG IV INF INIT: CPT

## 2019-10-31 PROCEDURE — 99285 EMERGENCY DEPT VISIT HI MDM: CPT | Mod: 25

## 2019-10-31 PROCEDURE — 87798 DETECT AGENT NOS DNA AMP: CPT

## 2019-10-31 PROCEDURE — 87633 RESP VIRUS 12-25 TARGETS: CPT

## 2019-10-31 PROCEDURE — 93005 ELECTROCARDIOGRAM TRACING: CPT

## 2019-10-31 PROCEDURE — 36415 COLL VENOUS BLD VENIPUNCTURE: CPT

## 2019-10-31 PROCEDURE — 71045 X-RAY EXAM CHEST 1 VIEW: CPT

## 2019-10-31 PROCEDURE — 82803 BLOOD GASES ANY COMBINATION: CPT

## 2019-10-31 PROCEDURE — 96375 TX/PRO/DX INJ NEW DRUG ADDON: CPT

## 2019-10-31 PROCEDURE — 80053 COMPREHEN METABOLIC PANEL: CPT

## 2019-10-31 PROCEDURE — 87040 BLOOD CULTURE FOR BACTERIA: CPT

## 2019-10-31 PROCEDURE — 94640 AIRWAY INHALATION TREATMENT: CPT

## 2019-10-31 PROCEDURE — 84300 ASSAY OF URINE SODIUM: CPT

## 2019-10-31 PROCEDURE — 83605 ASSAY OF LACTIC ACID: CPT

## 2019-10-31 PROCEDURE — 96372 THER/PROPH/DIAG INJ SC/IM: CPT | Mod: XU

## 2019-10-31 PROCEDURE — 82570 ASSAY OF URINE CREATININE: CPT

## 2019-10-31 PROCEDURE — 85025 COMPLETE CBC W/AUTO DIFF WBC: CPT

## 2019-10-31 PROCEDURE — 83735 ASSAY OF MAGNESIUM: CPT

## 2020-02-26 NOTE — ED ADULT TRIAGE NOTE - MEANS OF ARRIVAL
Pt is ready for d/c today. Pt has been accepted at [facility] for acute rehab. Pt will go into Room 1183. RN to call report to [phone]. [address] Chandrak[address] Min is scheduled to  pt at 6:00pm today. PCS form is completed and will be placed on chart.   Pt's ambulatory

## 2020-05-12 NOTE — PATIENT PROFILE ADULT - FUNCTIONAL SCREEN CURRENT LEVEL: COMMUNICATION, MLM
No proteinuria. A1C of 9.8 follow up with Dr Mckenzie
Sent via portal
0 = understands/communicates without difficulty

## 2023-11-17 ENCOUNTER — APPOINTMENT (OUTPATIENT)
Dept: ORTHOPEDIC SURGERY | Facility: CLINIC | Age: 38
End: 2023-11-17
Payer: COMMERCIAL

## 2023-11-17 VITALS — HEIGHT: 69 IN | WEIGHT: 265 LBS | BODY MASS INDEX: 39.25 KG/M2

## 2023-11-17 DIAGNOSIS — Z78.9 OTHER SPECIFIED HEALTH STATUS: ICD-10-CM

## 2023-11-17 DIAGNOSIS — M19.079 PRIMARY OSTEOARTHRITIS, UNSPECIFIED ANKLE AND FOOT: ICD-10-CM

## 2023-11-17 DIAGNOSIS — Z00.00 ENCOUNTER FOR GENERAL ADULT MEDICAL EXAMINATION W/OUT ABNORMAL FINDINGS: ICD-10-CM

## 2023-11-17 PROCEDURE — 73600 X-RAY EXAM OF ANKLE: CPT | Mod: RT

## 2023-11-17 PROCEDURE — 73650 X-RAY EXAM OF HEEL: CPT | Mod: RT

## 2023-11-17 PROCEDURE — 73630 X-RAY EXAM OF FOOT: CPT | Mod: RT

## 2023-12-04 ENCOUNTER — APPOINTMENT (OUTPATIENT)
Dept: CT IMAGING | Facility: CLINIC | Age: 38
End: 2023-12-04
Payer: COMMERCIAL

## 2023-12-04 PROCEDURE — 76376 3D RENDER W/INTRP POSTPROCES: CPT | Mod: RT

## 2023-12-04 PROCEDURE — 73700 CT LOWER EXTREMITY W/O DYE: CPT | Mod: RT

## 2023-12-05 ENCOUNTER — APPOINTMENT (OUTPATIENT)
Dept: ORTHOPEDIC SURGERY | Facility: CLINIC | Age: 38
End: 2023-12-05
Payer: COMMERCIAL

## 2023-12-05 DIAGNOSIS — S92.011S: ICD-10-CM

## 2023-12-05 PROCEDURE — 99213 OFFICE O/P EST LOW 20 MIN: CPT

## 2024-01-03 ENCOUNTER — APPOINTMENT (OUTPATIENT)
Dept: ORTHOPEDIC SURGERY | Facility: CLINIC | Age: 39
End: 2024-01-03
Payer: COMMERCIAL

## 2024-01-03 VITALS — HEIGHT: 69 IN | WEIGHT: 265 LBS | BODY MASS INDEX: 39.25 KG/M2

## 2024-01-03 DIAGNOSIS — M19.071 PRIMARY OSTEOARTHRITIS, RIGHT ANKLE AND FOOT: ICD-10-CM

## 2024-01-03 DIAGNOSIS — G89.4 CHRONIC PAIN SYNDROME: ICD-10-CM

## 2024-01-03 DIAGNOSIS — Z78.9 OTHER SPECIFIED HEALTH STATUS: ICD-10-CM

## 2024-01-03 PROCEDURE — 99214 OFFICE O/P EST MOD 30 MIN: CPT

## 2024-01-03 NOTE — PHYSICAL EXAM
[Right] : right foot and ankle [1+] : dorsalis pedis pulse: 1+ [] : no calf tenderness [FreeTextEntry3] : healed medial skin graft [de-identified] : plantar flexion 30 degrees [de-identified] : inversion 10 degrees [de-identified] : eversion 0 degrees [[id number]] : dorsiflexion 10 degrees

## 2024-01-03 NOTE — DISCUSSION/SUMMARY
[Surgical risks reviewed] : Surgical risks reviewed [de-identified] : Discussed salvage subtalar fusion with graft Pain mgt  The risks, benefits, alternatives have been discussed.  The risks include but are not limited to infection, bleeding, injury to small nerves and blood vessels, pain, stiffness, progression, dvt, PE, amputation and death.

## 2024-01-19 RX ORDER — OXYCODONE 10 MG/1
10 TABLET ORAL EVERY 6 HOURS
Qty: 40 | Refills: 0 | Status: ACTIVE | COMMUNITY
Start: 2023-11-17 | End: 1900-01-01

## 2024-02-17 ENCOUNTER — OFFICE (OUTPATIENT)
Dept: URBAN - METROPOLITAN AREA CLINIC 6 | Facility: CLINIC | Age: 39
Setting detail: OPHTHALMOLOGY
End: 2024-02-17
Payer: COMMERCIAL

## 2024-02-17 DIAGNOSIS — H40.51X1: ICD-10-CM

## 2024-02-17 DIAGNOSIS — H25.11: ICD-10-CM

## 2024-02-17 PROCEDURE — 92250 FUNDUS PHOTOGRAPHY W/I&R: CPT | Performed by: OPHTHALMOLOGY

## 2024-02-17 PROCEDURE — 99204 OFFICE O/P NEW MOD 45 MIN: CPT | Performed by: OPHTHALMOLOGY

## 2024-02-17 PROCEDURE — 92133 CPTRZD OPH DX IMG PST SGM ON: CPT | Performed by: OPHTHALMOLOGY

## 2024-02-17 ASSESSMENT — REFRACTION_MANIFEST
OS_VA2: 20/20(J1+)
OS_VA1: 20/20
OD_SPHERE: -5.00
OS_AXIS: 060
OD_VA1: 20/70
OD_AXIS: 125
OS_SPHERE: -0.25
OS_ADD: +1.50
OS_CYLINDER: -0.25
OD_VA2: 20/20(J1+)
OD_ADD: +1.50
OU_VA: 20/20
OD_CYLINDER: -2.75

## 2024-02-17 ASSESSMENT — REFRACTION_AUTOREFRACTION
OS_CYLINDER: -0.25
OD_CYLINDER: -3.00
OD_SPHERE: -11.50
OD_AXIS: 125
OS_AXIS: 062
OS_SPHERE: -0.25

## 2024-02-17 ASSESSMENT — CONFRONTATIONAL VISUAL FIELD TEST (CVF)
OD_FINDINGS: FULL
OS_FINDINGS: FULL

## 2024-02-17 ASSESSMENT — SPHEQUIV_DERIVED
OD_SPHEQUIV: -6.375
OD_SPHEQUIV: -13
OS_SPHEQUIV: -0.375
OS_SPHEQUIV: -0.375

## 2024-02-19 ENCOUNTER — OFFICE (OUTPATIENT)
Dept: URBAN - METROPOLITAN AREA CLINIC 104 | Facility: CLINIC | Age: 39
Setting detail: OPHTHALMOLOGY
End: 2024-02-19
Payer: COMMERCIAL

## 2024-02-19 DIAGNOSIS — H57.03: ICD-10-CM

## 2024-02-19 DIAGNOSIS — H25.13: ICD-10-CM

## 2024-02-19 DIAGNOSIS — H40.1134: ICD-10-CM

## 2024-02-19 DIAGNOSIS — H27.121: ICD-10-CM

## 2024-02-19 DIAGNOSIS — H25.11: ICD-10-CM

## 2024-02-19 PROCEDURE — 92136 OPHTHALMIC BIOMETRY: CPT | Mod: RT | Performed by: OPHTHALMOLOGY

## 2024-02-19 PROCEDURE — 99213 OFFICE O/P EST LOW 20 MIN: CPT | Performed by: OPHTHALMOLOGY

## 2024-02-19 ASSESSMENT — SPHEQUIV_DERIVED
OD_SPHEQUIV: -6.375
OS_SPHEQUIV: -0.375
OD_SPHEQUIV: -8.375
OS_SPHEQUIV: 0

## 2024-02-19 ASSESSMENT — CONFRONTATIONAL VISUAL FIELD TEST (CVF)
OS_FINDINGS: FULL
OD_FINDINGS: FULL

## 2024-02-19 ASSESSMENT — REFRACTION_MANIFEST
OD_AXIS: 125
OD_VA1: 20/70
OS_CYLINDER: -0.25
OS_AXIS: 060
OS_VA2: 20/20(J1+)
OS_ADD: +1.50
OD_CYLINDER: -2.75
OS_VA1: 20/20
OD_VA2: 20/20(J1+)
OD_SPHERE: -5.00
OS_SPHERE: -0.25
OD_ADD: +1.50
OU_VA: 20/20

## 2024-02-19 ASSESSMENT — REFRACTION_AUTOREFRACTION
OS_CYLINDER: -0.50
OS_AXIS: 37
OD_AXIS: 139
OD_CYLINDER: -8.75
OD_SPHERE: -4.00
OS_SPHERE: +0.25

## 2024-02-22 ENCOUNTER — RX ONLY (RX ONLY)
Age: 39
End: 2024-02-22

## 2024-02-23 ENCOUNTER — ASC (OUTPATIENT)
Dept: URBAN - METROPOLITAN AREA SURGERY 8 | Facility: SURGERY | Age: 39
Setting detail: OPHTHALMOLOGY
End: 2024-02-23
Payer: COMMERCIAL

## 2024-02-23 DIAGNOSIS — H43.391: ICD-10-CM

## 2024-02-23 DIAGNOSIS — H57.03: ICD-10-CM

## 2024-02-23 DIAGNOSIS — H25.11: ICD-10-CM

## 2024-02-23 DIAGNOSIS — H27.121: ICD-10-CM

## 2024-02-23 DIAGNOSIS — H25.89: ICD-10-CM

## 2024-02-23 PROCEDURE — 66982 XCAPSL CTRC RMVL CPLX WO ECP: CPT | Mod: RT | Performed by: OPHTHALMOLOGY

## 2024-02-23 PROCEDURE — 67036 REMOVAL OF INNER EYE FLUID: CPT | Mod: RT | Performed by: OPHTHALMOLOGY

## 2024-02-24 ENCOUNTER — OFFICE (OUTPATIENT)
Dept: URBAN - METROPOLITAN AREA CLINIC 104 | Facility: CLINIC | Age: 39
Setting detail: OPHTHALMOLOGY
End: 2024-02-24
Payer: COMMERCIAL

## 2024-02-24 DIAGNOSIS — Z96.1: ICD-10-CM

## 2024-02-24 PROCEDURE — 99024 POSTOP FOLLOW-UP VISIT: CPT | Performed by: OPTOMETRIST

## 2024-02-24 ASSESSMENT — REFRACTION_MANIFEST
OS_SPHERE: -0.25
OD_VA2: 20/20(J1+)
OD_VA1: 20/70
OU_VA: 20/20
OD_SPHERE: -5.00
OS_VA2: 20/20(J1+)
OD_ADD: +1.50
OD_AXIS: 125
OS_AXIS: 060
OS_VA1: 20/20
OS_ADD: +1.50
OD_CYLINDER: -2.75
OS_CYLINDER: -0.25

## 2024-02-24 ASSESSMENT — REFRACTION_AUTOREFRACTION
OS_AXIS: 37
OS_CYLINDER: -0.50
OD_SPHERE: UNABLE
OS_SPHERE: +0.25

## 2024-02-24 ASSESSMENT — CONFRONTATIONAL VISUAL FIELD TEST (CVF)
OS_FINDINGS: FULL
OD_FINDINGS: FULL

## 2024-02-24 ASSESSMENT — SPHEQUIV_DERIVED
OS_SPHEQUIV: 0
OD_SPHEQUIV: -6.375
OS_SPHEQUIV: -0.375

## 2024-02-24 ASSESSMENT — CORNEAL EDEMA CLINICAL DESCRIPTION: OD_CORNEALEDEMA: T

## 2024-02-29 ENCOUNTER — OFFICE (OUTPATIENT)
Dept: URBAN - METROPOLITAN AREA CLINIC 104 | Facility: CLINIC | Age: 39
Setting detail: OPHTHALMOLOGY
End: 2024-02-29
Payer: COMMERCIAL

## 2024-02-29 DIAGNOSIS — Z96.1: ICD-10-CM

## 2024-02-29 PROBLEM — H40.1134 POAG; BOTH EYES INDETERMINATE: Status: ACTIVE | Noted: 2024-02-19

## 2024-02-29 PROBLEM — H26.20 COMPLICATED CATARACT, NOS: Status: ACTIVE | Noted: 2024-02-19

## 2024-02-29 PROBLEM — H25.11 CATARACT SENILE NUCLEAR SCLEROSIS; RIGHT EYE: Status: ACTIVE | Noted: 2024-02-24

## 2024-02-29 PROBLEM — H43.391 FLOATERS; RIGHT EYE: Status: ACTIVE | Noted: 2024-02-19

## 2024-02-29 PROBLEM — H25.89 OTHER AGE RELATED CATARACT: Status: ACTIVE | Noted: 2024-02-19

## 2024-02-29 PROBLEM — H57.03 MIOSIS: Status: ACTIVE | Noted: 2024-02-19

## 2024-02-29 PROBLEM — H27.121 ANTERIOR DISLOCATION OF LENS; RIGHT EYE: Status: ACTIVE | Noted: 2024-02-19

## 2024-02-29 PROBLEM — H25.811 CATARACT SENILE NUCLEAR SCLEROSIS; RIGHT EYE: Status: ACTIVE | Noted: 2024-02-24

## 2024-02-29 PROCEDURE — 99024 POSTOP FOLLOW-UP VISIT: CPT | Performed by: OPTOMETRIST

## 2024-02-29 ASSESSMENT — REFRACTION_MANIFEST
OD_AXIS: 125
OS_AXIS: 060
OS_ADD: +1.50
OD_CYLINDER: -2.75
OD_SPHERE: -5.00
OD_VA2: 20/20(J1+)
OU_VA: 20/20
OS_VA2: 20/20(J1+)
OS_CYLINDER: -0.25
OS_SPHERE: -0.25
OD_ADD: +1.50
OS_VA1: 20/20
OD_VA1: 20/70

## 2024-02-29 ASSESSMENT — SPHEQUIV_DERIVED
OS_SPHEQUIV: -0.375
OD_SPHEQUIV: -6.375

## 2024-03-18 ENCOUNTER — OFFICE (OUTPATIENT)
Dept: URBAN - METROPOLITAN AREA CLINIC 104 | Facility: CLINIC | Age: 39
Setting detail: OPHTHALMOLOGY
End: 2024-03-18
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
## 2024-03-18 PROCEDURE — NO SHOW FE NO SHOW FEE: Performed by: OPTOMETRIST
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
